# Patient Record
Sex: FEMALE | Race: BLACK OR AFRICAN AMERICAN | NOT HISPANIC OR LATINO | Employment: UNEMPLOYED | URBAN - METROPOLITAN AREA
[De-identification: names, ages, dates, MRNs, and addresses within clinical notes are randomized per-mention and may not be internally consistent; named-entity substitution may affect disease eponyms.]

---

## 2024-06-28 ENCOUNTER — HOSPITAL ENCOUNTER (EMERGENCY)
Facility: HOSPITAL | Age: 24
Discharge: HOME | End: 2024-06-28
Attending: EMERGENCY MEDICINE

## 2024-06-28 VITALS
SYSTOLIC BLOOD PRESSURE: 116 MMHG | DIASTOLIC BLOOD PRESSURE: 46 MMHG | RESPIRATION RATE: 18 BRPM | HEART RATE: 56 BPM | TEMPERATURE: 98 F | OXYGEN SATURATION: 97 %

## 2024-06-28 DIAGNOSIS — R11.2 NAUSEA AND VOMITING, UNSPECIFIED VOMITING TYPE: Primary | ICD-10-CM

## 2024-06-28 LAB
ALBUMIN SERPL-MCNC: 3.9 G/DL (ref 3.5–5)
ALBUMIN/GLOB SERPL: 1 RATIO (ref 1.1–2)
ALP SERPL-CCNC: 59 UNIT/L (ref 40–150)
ALT SERPL-CCNC: 17 UNIT/L (ref 0–55)
AMPHET UR QL SCN: NEGATIVE
ANION GAP SERPL CALC-SCNC: 12 MEQ/L
AST SERPL-CCNC: 35 UNIT/L (ref 5–34)
B-HCG UR QL: NEGATIVE
BACTERIA #/AREA URNS AUTO: ABNORMAL /HPF
BARBITURATE SCN PRESENT UR: NEGATIVE
BASOPHILS # BLD AUTO: 0.02 X10(3)/MCL
BASOPHILS NFR BLD AUTO: 0.2 %
BENZODIAZ UR QL SCN: NEGATIVE
BILIRUB SERPL-MCNC: 0.4 MG/DL
BILIRUB UR QL STRIP.AUTO: NEGATIVE
BUN SERPL-MCNC: 13.3 MG/DL (ref 7–18.7)
C TRACH DNA SPEC QL NAA+PROBE: NOT DETECTED
CALCIUM SERPL-MCNC: 9.2 MG/DL (ref 8.4–10.2)
CANNABINOIDS UR QL SCN: POSITIVE
CHLORIDE SERPL-SCNC: 108 MMOL/L (ref 98–107)
CLARITY UR: ABNORMAL
CO2 SERPL-SCNC: 23 MMOL/L (ref 22–29)
COCAINE UR QL SCN: NEGATIVE
COLOR UR AUTO: YELLOW
CREAT SERPL-MCNC: 0.89 MG/DL (ref 0.55–1.02)
CREAT/UREA NIT SERPL: 15
EOSINOPHIL # BLD AUTO: 0.02 X10(3)/MCL (ref 0–0.9)
EOSINOPHIL NFR BLD AUTO: 0.2 %
ERYTHROCYTE [DISTWIDTH] IN BLOOD BY AUTOMATED COUNT: 11.9 % (ref 11.5–17)
ETHANOL SERPL-MCNC: <10 MG/DL
FENTANYL UR QL SCN: NEGATIVE
GFR SERPLBLD CREATININE-BSD FMLA CKD-EPI: >60 ML/MIN/1.73/M2
GLOBULIN SER-MCNC: 3.8 GM/DL (ref 2.4–3.5)
GLUCOSE SERPL-MCNC: 135 MG/DL (ref 74–100)
GLUCOSE UR QL STRIP: NEGATIVE
HCT VFR BLD AUTO: 41.1 % (ref 37–47)
HGB BLD-MCNC: 13.7 G/DL (ref 12–16)
HGB UR QL STRIP: ABNORMAL
IMM GRANULOCYTES # BLD AUTO: 0.05 X10(3)/MCL (ref 0–0.04)
IMM GRANULOCYTES NFR BLD AUTO: 0.4 %
KETONES UR QL STRIP: 40
LEUKOCYTE ESTERASE UR QL STRIP: ABNORMAL
LIPASE SERPL-CCNC: 16 U/L
LYMPHOCYTES # BLD AUTO: 1.25 X10(3)/MCL (ref 0.6–4.6)
LYMPHOCYTES NFR BLD AUTO: 11 %
MCH RBC QN AUTO: 30.9 PG (ref 27–31)
MCHC RBC AUTO-ENTMCNC: 33.3 G/DL (ref 33–36)
MCV RBC AUTO: 92.6 FL (ref 80–94)
MDMA UR QL SCN: NEGATIVE
MONOCYTES # BLD AUTO: 0.22 X10(3)/MCL (ref 0.1–1.3)
MONOCYTES NFR BLD AUTO: 1.9 %
N GONORRHOEA DNA SPEC QL NAA+PROBE: NOT DETECTED
NEUTROPHILS # BLD AUTO: 9.78 X10(3)/MCL (ref 2.1–9.2)
NEUTROPHILS NFR BLD AUTO: 86.3 %
NITRITE UR QL STRIP: NEGATIVE
NRBC BLD AUTO-RTO: 0 %
OPIATES UR QL SCN: NEGATIVE
PCP UR QL: NEGATIVE
PH UR STRIP: 7 [PH]
PH UR: 7 [PH] (ref 3–11)
PLATELET # BLD AUTO: 257 X10(3)/MCL (ref 130–400)
PMV BLD AUTO: 9.4 FL (ref 7.4–10.4)
POTASSIUM SERPL-SCNC: 3.6 MMOL/L (ref 3.5–5.1)
PROT SERPL-MCNC: 7.7 GM/DL (ref 6.4–8.3)
PROT UR QL STRIP: 30
RBC # BLD AUTO: 4.44 X10(6)/MCL (ref 4.2–5.4)
RBC #/AREA URNS AUTO: ABNORMAL /HPF
SODIUM SERPL-SCNC: 143 MMOL/L (ref 136–145)
SOURCE (OHS): NORMAL
SP GR UR STRIP.AUTO: 1.02 (ref 1–1.03)
SPECIFIC GRAVITY, URINE AUTO (.000) (OHS): 1.02 (ref 1–1.03)
SQUAMOUS #/AREA URNS AUTO: ABNORMAL /HPF
UROBILINOGEN UR STRIP-ACNC: 0.2
WBC # BLD AUTO: 11.34 X10(3)/MCL (ref 4.5–11.5)
WBC #/AREA URNS AUTO: ABNORMAL /HPF

## 2024-06-28 PROCEDURE — 87591 N.GONORRHOEAE DNA AMP PROB: CPT | Performed by: EMERGENCY MEDICINE

## 2024-06-28 PROCEDURE — 82077 ASSAY SPEC XCP UR&BREATH IA: CPT | Performed by: EMERGENCY MEDICINE

## 2024-06-28 PROCEDURE — 80307 DRUG TEST PRSMV CHEM ANLYZR: CPT | Performed by: EMERGENCY MEDICINE

## 2024-06-28 PROCEDURE — 96375 TX/PRO/DX INJ NEW DRUG ADDON: CPT

## 2024-06-28 PROCEDURE — 96361 HYDRATE IV INFUSION ADD-ON: CPT

## 2024-06-28 PROCEDURE — 85025 COMPLETE CBC W/AUTO DIFF WBC: CPT | Performed by: EMERGENCY MEDICINE

## 2024-06-28 PROCEDURE — 96374 THER/PROPH/DIAG INJ IV PUSH: CPT

## 2024-06-28 PROCEDURE — 87491 CHLMYD TRACH DNA AMP PROBE: CPT | Performed by: EMERGENCY MEDICINE

## 2024-06-28 PROCEDURE — 81003 URINALYSIS AUTO W/O SCOPE: CPT | Performed by: EMERGENCY MEDICINE

## 2024-06-28 PROCEDURE — 25500020 PHARM REV CODE 255: Performed by: EMERGENCY MEDICINE

## 2024-06-28 PROCEDURE — 81001 URINALYSIS AUTO W/SCOPE: CPT | Mod: XB | Performed by: EMERGENCY MEDICINE

## 2024-06-28 PROCEDURE — 81025 URINE PREGNANCY TEST: CPT | Performed by: EMERGENCY MEDICINE

## 2024-06-28 PROCEDURE — 83690 ASSAY OF LIPASE: CPT | Performed by: EMERGENCY MEDICINE

## 2024-06-28 PROCEDURE — 99285 EMERGENCY DEPT VISIT HI MDM: CPT | Mod: 25

## 2024-06-28 PROCEDURE — 25000003 PHARM REV CODE 250: Performed by: EMERGENCY MEDICINE

## 2024-06-28 PROCEDURE — 63600175 PHARM REV CODE 636 W HCPCS: Performed by: EMERGENCY MEDICINE

## 2024-06-28 PROCEDURE — 80053 COMPREHEN METABOLIC PANEL: CPT | Performed by: EMERGENCY MEDICINE

## 2024-06-28 RX ORDER — ONDANSETRON HYDROCHLORIDE 2 MG/ML
4 INJECTION, SOLUTION INTRAVENOUS
Status: COMPLETED | OUTPATIENT
Start: 2024-06-28 | End: 2024-06-28

## 2024-06-28 RX ORDER — KETOROLAC TROMETHAMINE 30 MG/ML
30 INJECTION, SOLUTION INTRAMUSCULAR; INTRAVENOUS
Status: COMPLETED | OUTPATIENT
Start: 2024-06-28 | End: 2024-06-28

## 2024-06-28 RX ORDER — FAMOTIDINE 10 MG/ML
20 INJECTION INTRAVENOUS
Status: COMPLETED | OUTPATIENT
Start: 2024-06-28 | End: 2024-06-28

## 2024-06-28 RX ORDER — HYDROMORPHONE HYDROCHLORIDE 2 MG/ML
0.5 INJECTION, SOLUTION INTRAMUSCULAR; INTRAVENOUS; SUBCUTANEOUS
Status: COMPLETED | OUTPATIENT
Start: 2024-06-28 | End: 2024-06-28

## 2024-06-28 RX ORDER — ONDANSETRON 4 MG/1
4 TABLET, FILM COATED ORAL EVERY 6 HOURS
Qty: 12 TABLET | Refills: 0 | Status: SHIPPED | OUTPATIENT
Start: 2024-06-28

## 2024-06-28 RX ORDER — SODIUM CHLORIDE 9 MG/ML
1000 INJECTION, SOLUTION INTRAVENOUS
Status: COMPLETED | OUTPATIENT
Start: 2024-06-28 | End: 2024-06-28

## 2024-06-28 RX ORDER — DIPHENHYDRAMINE HYDROCHLORIDE 50 MG/ML
25 INJECTION INTRAMUSCULAR; INTRAVENOUS
Status: COMPLETED | OUTPATIENT
Start: 2024-06-28 | End: 2024-06-28

## 2024-06-28 RX ORDER — FAMOTIDINE 40 MG/1
40 TABLET, FILM COATED ORAL 2 TIMES DAILY PRN
Qty: 30 TABLET | Refills: 11 | Status: SHIPPED | OUTPATIENT
Start: 2024-06-28 | End: 2025-06-28

## 2024-06-28 RX ORDER — PROCHLORPERAZINE EDISYLATE 5 MG/ML
5 INJECTION INTRAMUSCULAR; INTRAVENOUS
Status: COMPLETED | OUTPATIENT
Start: 2024-06-28 | End: 2024-06-28

## 2024-06-28 RX ORDER — PROCHLORPERAZINE 25 MG/1
25 SUPPOSITORY RECTAL EVERY 12 HOURS PRN
Qty: 10 SUPPOSITORY | Refills: 0 | Status: SHIPPED | OUTPATIENT
Start: 2024-06-28

## 2024-06-28 RX ADMIN — IOHEXOL 99 ML: 350 INJECTION, SOLUTION INTRAVENOUS at 04:06

## 2024-06-28 RX ADMIN — KETOROLAC TROMETHAMINE 30 MG: 30 INJECTION, SOLUTION INTRAMUSCULAR at 05:06

## 2024-06-28 RX ADMIN — SODIUM CHLORIDE 1000 ML: 9 INJECTION, SOLUTION INTRAVENOUS at 06:06

## 2024-06-28 RX ADMIN — ONDANSETRON 4 MG: 2 INJECTION INTRAMUSCULAR; INTRAVENOUS at 05:06

## 2024-06-28 RX ADMIN — DIPHENHYDRAMINE HYDROCHLORIDE 25 MG: 50 INJECTION INTRAMUSCULAR; INTRAVENOUS at 06:06

## 2024-06-28 RX ADMIN — FAMOTIDINE 20 MG: 10 INJECTION, SOLUTION INTRAVENOUS at 02:06

## 2024-06-28 RX ADMIN — HYDROMORPHONE HYDROCHLORIDE 0.5 MG: 2 INJECTION, SOLUTION INTRAMUSCULAR; INTRAVENOUS; SUBCUTANEOUS at 06:06

## 2024-06-28 RX ADMIN — SODIUM CHLORIDE 1000 ML: 9 INJECTION, SOLUTION INTRAVENOUS at 02:06

## 2024-06-28 RX ADMIN — PROCHLORPERAZINE EDISYLATE 5 MG: 5 INJECTION INTRAMUSCULAR; INTRAVENOUS at 02:06

## 2024-06-28 NOTE — Clinical Note
"Kevan Purcell" Johan was seen and treated in our emergency department on 6/28/2024.  She may return to work on 06/29/2024.       If you have any questions or concerns, please don't hesitate to call.       RN    "

## 2024-06-28 NOTE — ED PROVIDER NOTES
"Encounter Date: 6/28/2024       History     Chief Complaint   Patient presents with    Nausea    Vomiting     23-year-old female with a negative past medical history complains of 6 hours of nausea, vomiting, lower abdominal cramping.  She admits to drinking some alcohol about 10 hours ago but nothing recent.  Last menstrual period last week and she just stopped bleeding 2 days ago.  She has no dysuria or vaginal discharge.  She has had no diarrhea.  She states she just feels very clammy and weak.  She was given Zofran 4 mg IV and about 200 mL of saline prior to arrival by [facility] in ambulance but states she has not feeling any better. She states she has no symptoms or risk of STD,  "definitely not" she says.  She is here with her mom and gives permission to share medical information with her mom.    The history is provided by the patient.     Review of patient's allergies indicates:  No Known Allergies  History reviewed. No pertinent past medical history.  History reviewed. No pertinent surgical history.  No family history on file.  Social History     Tobacco Use    Smoking status: Never    Smokeless tobacco: Never     Review of Systems   Gastrointestinal:  Positive for abdominal pain, nausea and vomiting.   All other systems reviewed and are negative.      Physical Exam     Initial Vitals [06/28/24 0254]   BP Pulse Resp Temp SpO2   (!) 181/90 68 16 97.7 °F (36.5 °C) 96 %      MAP       --         Physical Exam    Nursing note and vitals reviewed.  Constitutional: She appears well-developed and well-nourished. She is not diaphoretic. No distress.   HENT:   Head: Normocephalic and atraumatic.   Mouth/Throat: Oropharynx is clear and moist.   Eyes: Conjunctivae are normal. Pupils are equal, round, and reactive to light.   Neck: Neck supple.   Cardiovascular:  Normal rate, regular rhythm, normal heart sounds and intact distal pulses.           Pulmonary/Chest: Breath sounds normal. No respiratory distress. She has no " wheezes. She has no rhonchi. She has no rales.   Abdominal: Abdomen is soft. She exhibits no distension. There is abdominal tenderness.   Moderate lower abdominal tenderness, both left and right lower quadrant as well as suprapubic area.  No mass.  No rebound or guarding. There is no guarding.   Musculoskeletal:         General: No tenderness or edema. Normal range of motion.      Cervical back: Neck supple.     Neurological: She is alert and oriented to person, place, and time.   Skin: Skin is warm and dry. Capillary refill takes less than 2 seconds. No rash noted.   Psychiatric: She has a normal mood and affect. Thought content normal.         ED Course   Procedures  Labs Reviewed   COMPREHENSIVE METABOLIC PANEL - Abnormal; Notable for the following components:       Result Value    Chloride 108 (*)     Glucose 135 (*)     Globulin 3.8 (*)     Albumin/Globulin Ratio 1.0 (*)     AST 35 (*)     All other components within normal limits   URINALYSIS, REFLEX TO URINE CULTURE - Abnormal; Notable for the following components:    Appearance, UA SL CLOUDY (*)     Protein, UA 30 (*)     Ketones, UA 40 (*)     Blood, UA Trace (*)     Leukocyte Esterase, UA Trace (*)     All other components within normal limits   DRUG SCREEN, URINE (BEAKER) - Abnormal; Notable for the following components:    Cannabinoids, Urine Positive (*)     All other components within normal limits    Narrative:     Cut off concentrations:    Amphetamines - 1000 ng/ml  Barbiturates - 200 ng/ml  Benzodiazepine - 200 ng/ml  Cannabinoids (THC) - 50 ng/ml  Cocaine - 300 ng/ml  Fentanyl - 1.0 ng/ml  MDMA - 500 ng/ml  Opiates - 300 ng/ml   Phencyclidine (PCP) - 25 ng/ml    Specimen submitted for drug analysis and tested for pH and specific gravity in order to evaluate sample integrity. Suspect tampering if specific gravity is <1.003 and/or pH is not within the range of 4.5 - 8.0  False negatives may result form substances such as bleach added to  urine.  False positives may result for the presence of a substance with similar chemical structure to the drug or its metabolite.    This test provides only a PRELIMINARY analytical test result. A more specific alternate chemical method must be used in order to obtain a confirmed analytical result. Gas chromatography/mass spectrometry (GC/MS) is the preferred confirmatory method. Other chemical confirmation methods are available. Clinical consideration and professional judgement should be applied to any drug of abuse test result, particularly when preliminary positive results are used.    Positive results will be confirmed only at the physicians request. Unconfirmed screening results are to be used only for medical purposes (treatment).        CBC WITH DIFFERENTIAL - Abnormal; Notable for the following components:    Neut # 9.78 (*)     IG# 0.05 (*)     All other components within normal limits   URINALYSIS, MICROSCOPIC - Abnormal; Notable for the following components:    Bacteria, UA Few (*)     WBC, UA 6-10 (*)     Squamous Epithelial Cells, UA Moderate (*)     All other components within normal limits   LIPASE - Normal   ALCOHOL,MEDICAL (ETHANOL) - Normal   PREGNANCY TEST, URINE RAPID - Normal   CHLAMYDIA/GONORRHOEAE(GC), PCR   CBC W/ AUTO DIFFERENTIAL    Narrative:     The following orders were created for panel order CBC auto differential.  Procedure                               Abnormality         Status                     ---------                               -----------         ------                     CBC with Differential[[id number]]        Abnormal            Final result                 Please view results for these tests on the individual orders.          Imaging Results              US PELVIS COMPLETE NON OB WITH DOPPLER (XPD) (Final result)  Result time 06/28/24 06:32:51   Procedure changed from US Pelvis Comp with Transvag NON-OB (xpd     Final result by Kenton Parker MD (06/28/24 06:32:51)                    Impression:      No abnormality with sonography identified.      Electronically signed by: Kenton Parker  Date:    06/28/2024  Time:    06:32               Narrative:    EXAMINATION:  Ultrasound pelvis complete non OB with Doppler    CLINICAL HISTORY:  Pelvic pain    TECHNIQUE:  Multiple real-time images were performed pelvis in various planes by the sonographer.    COMPARISON:  CT abdomen pelvis same date.    FINDINGS:  Uterus measures 5.2 x 2.8 x 4.4 cm.  Uterine myometrial echotexture is unremarkable.  Endometrial stripe thickness is 2.3 mm.    Right ovary measures 4.8 x 1.2 x 2.6 cm and the left ovary measures 2.5 x 1.3 x 1.7 cm.  Bilateral ovaries appear to be unremarkable.  Bilateral unremarkable arteriovenous flow to the ovaries.  No pelvic free fluid identified.                                       CT Abdomen Pelvis With IV Contrast NO Oral Contrast (Final result)  Result time 06/28/24 05:48:45      Final result by Kenton Parker MD (06/28/24 05:48:45)                   Impression:    Impression:    No acute intraabdominal or pelvic solid organ or bowel pathology identified. Details and other findings as discussed above.    No significant discrepancy with overnight report.      Electronically signed by: Kenton Parker  Date:    06/28/2024  Time:    05:48               Narrative:      Technique: CT of the abdomen and pelvis was performed with axial images as well as sagittal and coronal reconstruction images with intravenous contrast.    Comparison: None available.    Clinical History: 23-year-old female with a negative past medical history complains of 6 hours of nausea, vomiting, lower abdominal cramping. She admits to drinking some alcohol about 10 hours ago but nothing recent. Last menstrual period last week and she just stopped bleeding 2 days ago. She has no dysuria or vaginal discharge. She has had no diarrhea. She states she just feels very clammy and weak. She was given Zofran 4 mg  IV and about 200 mL of saline prior to arrival by [facility] in ambulance but states she has not.    Dosage Information: Automated Exposure Control was utilized.      Findings:    Lines and Tubes: None.    Thorax:    Lungs: The visualized lung bases appear unremarkable. No focal infiltrate or consolidation is seen.    Pleura: No effusions or thickening.    Abdomen:    Abdominal Wall: No abdominal wall pathology is seen.    Liver: The liver appears unremarkable.    Biliary System: No intrahepatic or extrahepatic biliary duct dilatation is seen.    Gallbladder: The gallbladder appears unremarkable.    Pancreas: The pancreas appears unremarkable.    Spleen: The spleen appears unremarkable.    Adrenals: The adrenal glands appear unremarkable.    Kidneys: The kidneys appear unremarkable with no stones cysts masses or hydronephrosis.    Aorta: The abdominal aorta appears unremarkable.    Bowel:    Esophagus: The visualized esophagus appears unremarkable.    Stomach: The stomach appears unremarkable.    Duodenum: Unremarkable appearing duodenum.    Small Bowel: The small bowel appears unremarkable.    Colon: Nondistended.    Appendix: The appendix appears unremarkable and is seen on Image 49, Series 3 through Image 51, Series 3.    Peritoneum: No intraperitoneal free air or ascites is seen.    Pelvis:    Bladder: The bladder appears unremarkable.    Female:    Uterus: The uterus appears unremarkable.    Ovaries: The ovaries appear unremarkable with probable bilateral physiologic cysts.    Bony structures:    Dorsal Spine: The visualized dorsal spine appears unremarkable.    Bony Pelvis: The visualized bony structures of the pelvis appear unremarkable.                        Preliminary result by Oskar Mays Jr., MD (06/28/24 05:00:49)                   Impression:    1. No acute intraabdominal or pelvic solid organ or bowel pathology identified. Details and other findings as discussed above.                Narrative:    START OF REPORT:  Technique: CT of the abdomen and pelvis was performed with axial images as well as sagittal and coronal reconstruction images with intravenous contrast.    Comparison: None available.    Clinical History: 23-year-old female with a negative past medical history complains of 6 hours of nausea, vomiting, lower abdominal cramping. She admits to drinking some alcohol about 10 hours ago but nothing recent. Last menstrual period last week and she just stopped bleeding 2 days ago. She has no dysuria or vaginal discharge. She has had no diarrhea. She states she just feels very clammy and weak. She was given Zofran 4 mg IV and about 200 mL of saline prior to arrival by [facility] in ambulance but states she has not.    Dosage Information: Automated Exposure Control was utilized.    Findings:  Lines and Tubes: None.  Thorax:  Lungs: The visualized lung bases appear unremarkable. No focal infiltrate or consolidation is seen.  Pleura: No effusions or thickening.  Heart: The heart size is within normal limits.  Abdomen:  Abdominal Wall: No abdominal wall pathology is seen.  Liver: The liver appears unremarkable.  Biliary System: No intrahepatic or extrahepatic biliary duct dilatation is seen.  Gallbladder: The gallbladder appears unremarkable.  Pancreas: The pancreas appears unremarkable.  Spleen: The spleen appears unremarkable.  Adrenals: The adrenal glands appear unremarkable.  Kidneys: The kidneys appear unremarkable with no stones cysts masses or hydronephrosis.  Aorta: The abdominal aorta appears unremarkable.  IVC: Unremarkable.  Bowel:  Esophagus: The visualized esophagus appears unremarkable.  Stomach: The stomach appears unremarkable.  Duodenum: Unremarkable appearing duodenum.  Small Bowel: The small bowel appears unremarkable.  Colon: Nondistended.  Appendix: The appendix appears unremarkable and is seen on Image 49, Series 3 through Image 51, Series 3.  Peritoneum: No intraperitoneal free  air or ascites is seen.    Pelvis:  Bladder: The bladder appears unremarkable.  Female:  Uterus: The uterus appears unremarkable.  Ovaries: The ovaries appear unremarkable with probable bilateral physiologic cysts.    Bony structures:  Dorsal Spine: The visualized dorsal spine appears unremarkable.  Bony Pelvis: The visualized bony structures of the pelvis appear unremarkable.                                         Medications   0.9%  NaCl infusion (0 mLs Intravenous Stopped 6/28/24 0357)   prochlorperazine injection Soln 5 mg (5 mg Intravenous Given 6/28/24 0255)   famotidine (PF) injection 20 mg (20 mg Intravenous Given 6/28/24 0256)   ondansetron injection 4 mg (4 mg Intravenous Given 6/28/24 0512)   iohexoL (OMNIPAQUE 350) injection 99 mL (99 mLs Intravenous Given 6/28/24 0443)   ketorolac injection 30 mg (30 mg Intravenous Given 6/28/24 0512)   diphenhydrAMINE injection 25 mg (25 mg Intravenous Given 6/28/24 0633)   HYDROmorphone (PF) injection 0.5 mg (0.5 mg Intravenous Given 6/28/24 0633)   0.9%  NaCl infusion (1,000 mLs Intravenous New Bag 6/28/24 0634)     Medical Decision Making  Amount and/or Complexity of Data Reviewed  Labs: ordered. Decision-making details documented in ED Course.  Radiology: ordered. Decision-making details documented in ED Course.    Risk  Prescription drug management.               ED Course as of 06/28/24 0933   Fri Jun 28, 2024   0338 Patient states she is too nauseated and having lower abdominal cramping.  Discussed doing a CT scan with her for symptoms do not improve.  Awaiting a UPT and chemistry result [SH]   0426 Cannabinoids, Urine(!): Positive [SH]   0426 Sodium: 143 [SH]   0426 Potassium: 3.6 [SH]   0426 Chloride(!): 108 [SH]   0426 CO2: 23 [SH]   0426 Glucose(!): 135 [SH]   0426 BUN: 13.3 [SH]   0426 Creatinine: 0.89 [SH]   0440 CT scan was done and is pending but patient vomited again so will give her a dose of Zofran. [SH]   0504 CT Abdomen Pelvis With IV Contrast NO  Oral Contrast  Preliminary reading of the CT scan is negative. [SH]   0504 Patient was still complaining of severe lower abdominal pain.  Her mom has stepped out of the room so I asked her in private whether or not she had any concern of STD and she says definitely not.  No discharge, no recent sexual encounters.  I will get a pelvic ultrasound to rule out ovarian torsion and try dose of Toradol for pain. [SH]   0617 Preliminary report of the ultrasound is negative for torsion or ovarian cyst.  She still complains of lower abdominal pain and nausea and states she just feels miserable.  She has not anything, up but occasionally dry heaving in the emesis bag.  I will give her a small dose of Dilaudid, 0.5 mg for pain, and Benadryl and observe for a while longer. [SH]   0705 Patient states that she is feeling better but is very sleepy from the medication.  We will need to observe for a little while.  Transition of care at 7 AM. Dr. Jean Baptiste will assume care and determine appropriate treatment, care of this patient as well as disposition.    [SH]   0929 Patient is much more alert.  Still feeling well.  Pass p.o. challenge. []      ED Course User Index  [] Elisa Junior MD  [] Jesse Jean Baptiste MD                           Clinical Impression:  Final diagnoses:  [R11.2] Nausea and vomiting, unspecified vomiting type (Primary)          ED Disposition Condition    Discharge Stable          ED Prescriptions       Medication Sig Dispense Start Date End Date Auth. Provider    famotidine (PEPCID) 40 MG tablet Take 1 tablet (40 mg total) by mouth 2 (two) times daily as needed for Heartburn. 30 tablet 6/28/2024 6/28/2025 Jesse Jean Baptiste MD    ondansetron (ZOFRAN) 4 MG tablet Take 1 tablet (4 mg total) by mouth every 6 (six) hours. 12 tablet 6/28/2024 -- Jesse Jean Baptiste MD    prochlorperazine (COMPAZINE) 25 MG suppository Place 1 suppository (25 mg total) rectally every 12 (twelve) hours as needed for  Nausea. 10 suppository 6/28/2024 -- Jesse Jean Baptiste MD          Follow-up Information    None          Jesse Jean Baptiste MD  06/28/24 0958

## 2024-11-21 ENCOUNTER — HOSPITAL ENCOUNTER (EMERGENCY)
Facility: HOSPITAL | Age: 24
Discharge: HOME | End: 2024-11-21
Attending: EMERGENCY MEDICINE
Payer: COMMERCIAL

## 2024-11-21 VITALS
BODY MASS INDEX: 31.2 KG/M2 | OXYGEN SATURATION: 100 % | RESPIRATION RATE: 16 BRPM | HEART RATE: 75 BPM | DIASTOLIC BLOOD PRESSURE: 72 MMHG | SYSTOLIC BLOOD PRESSURE: 120 MMHG | TEMPERATURE: 98 F | HEIGHT: 59 IN | WEIGHT: 154.75 LBS
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
## 2024-11-21 DIAGNOSIS — R51.9 NONINTRACTABLE HEADACHE, UNSPECIFIED CHRONICITY PATTERN, UNSPECIFIED HEADACHE TYPE: ICD-10-CM

## 2024-11-21 LAB
B-HCG UR QL: NEGATIVE
CTP QC/QA: YES

## 2024-11-21 PROCEDURE — 25000003 PHARM REV CODE 250: Performed by: PHYSICIAN ASSISTANT

## 2024-11-21 PROCEDURE — 99283 EMERGENCY DEPT VISIT LOW MDM: CPT | Mod: 25

## 2024-11-21 PROCEDURE — 81025 URINE PREGNANCY TEST: CPT | Performed by: PHYSICIAN ASSISTANT

## 2024-11-21 RX ORDER — DULOXETIN HYDROCHLORIDE 30 MG/1
30 CAPSULE, DELAYED RELEASE ORAL DAILY
COMMUNITY
Start: 2024-02-22

## 2024-11-21 RX ORDER — FLUCONAZOLE 150 MG/1
150 TABLET ORAL DAILY
COMMUNITY
Start: 2024-03-08 | End: 2025-03-08

## 2024-11-21 RX ORDER — HYDROCODONE BITARTRATE AND ACETAMINOPHEN 7.5; 325 MG/1; MG/1
1 TABLET ORAL
Status: COMPLETED | OUTPATIENT
Start: 2024-11-21 | End: 2024-11-21

## 2024-11-21 RX ORDER — ERGOCALCIFEROL 1.25 MG/1
1 CAPSULE ORAL
COMMUNITY
Start: 2024-11-11 | End: 2025-05-10

## 2024-11-21 RX ORDER — DULOXETIN HYDROCHLORIDE 60 MG/1
60 CAPSULE, DELAYED RELEASE ORAL DAILY
COMMUNITY
Start: 2024-02-06

## 2024-11-21 RX ORDER — HYDROXYZINE HYDROCHLORIDE 10 MG/1
10 TABLET, FILM COATED ORAL 3 TIMES DAILY PRN
COMMUNITY
Start: 2024-02-06

## 2024-11-21 RX ORDER — IBUPROFEN 800 MG/1
800 TABLET ORAL EVERY 8 HOURS PRN
Qty: 15 TABLET | Refills: 0 | Status: SHIPPED | OUTPATIENT
Start: 2024-11-21

## 2024-11-21 RX ORDER — METRONIDAZOLE 500 MG/1
500 TABLET ORAL EVERY 12 HOURS
COMMUNITY
Start: 2024-11-11 | End: 2024-11-21

## 2024-11-21 RX ORDER — TRAZODONE HYDROCHLORIDE 50 MG/1
50 TABLET ORAL NIGHTLY
COMMUNITY
Start: 2024-01-16

## 2024-11-21 RX ORDER — SPIRONOLACTONE 50 MG/1
50 TABLET, FILM COATED ORAL DAILY
COMMUNITY
Start: 2024-10-24

## 2024-11-21 RX ORDER — TIZANIDINE 4 MG/1
4 TABLET ORAL EVERY 8 HOURS PRN
Qty: 15 TABLET | Refills: 0 | Status: SHIPPED | OUTPATIENT
Start: 2024-11-21

## 2024-11-21 RX ADMIN — HYDROCODONE BITARTRATE AND ACETAMINOPHEN 1 TABLET: 7.5; 325 TABLET ORAL at 12:11
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
## 2025-01-23 ENCOUNTER — HOSPITAL ENCOUNTER (EMERGENCY)
Facility: HOSPITAL | Age: 25
Discharge: HOME | End: 2025-01-23
Attending: EMERGENCY MEDICINE
Payer: MEDICAID

## 2025-01-23 VITALS
SYSTOLIC BLOOD PRESSURE: 130 MMHG | OXYGEN SATURATION: 99 % | TEMPERATURE: 100 F | DIASTOLIC BLOOD PRESSURE: 77 MMHG | RESPIRATION RATE: 18 BRPM | BODY MASS INDEX: 31.04 KG/M2 | HEIGHT: 59 IN | WEIGHT: 154 LBS | HEART RATE: 66 BPM

## 2025-01-23 DIAGNOSIS — J10.1 INFLUENZA A: Primary | ICD-10-CM

## 2025-01-23 LAB
FLUAV AG UPPER RESP QL IA.RAPID: DETECTED
FLUBV AG UPPER RESP QL IA.RAPID: NOT DETECTED
SARS-COV-2 RNA RESP QL NAA+PROBE: NOT DETECTED

## 2025-01-23 PROCEDURE — 0240U COVID/FLU A&B PCR: CPT | Performed by: PHYSICIAN ASSISTANT

## 2025-01-23 PROCEDURE — 99284 EMERGENCY DEPT VISIT MOD MDM: CPT

## 2025-01-23 RX ORDER — OSELTAMIVIR PHOSPHATE 75 MG/1
75 CAPSULE ORAL 2 TIMES DAILY
Qty: 10 CAPSULE | Refills: 0 | Status: SHIPPED | OUTPATIENT
Start: 2025-01-23 | End: 2025-01-28

## 2025-01-23 RX ORDER — DICLOFENAC SODIUM 50 MG/1
50 TABLET, DELAYED RELEASE ORAL 3 TIMES DAILY PRN
Qty: 12 TABLET | Refills: 0 | Status: SHIPPED | OUTPATIENT
Start: 2025-01-23

## 2025-01-23 RX ORDER — BENZONATATE 200 MG/1
200 CAPSULE ORAL 3 TIMES DAILY PRN
Qty: 15 CAPSULE | Refills: 0 | Status: SHIPPED | OUTPATIENT
Start: 2025-01-23 | End: 2025-01-28

## 2025-01-23 NOTE — Clinical Note
"Garymarco"Mabel Prado was seen and treated in our emergency department on 1/23/2025.  She may return to work on 01/28/2025.       If you have any questions or concerns, please don't hesitate to call.      Patricia Pierce PA-C"

## 2025-01-23 NOTE — ED PROVIDER NOTES
Encounter Date: 1/23/2025       History     Chief Complaint   Patient presents with    Generalized Body Aches     Body aches, cough fever & headache. Last ibuprofen at 1200. Denies sore throat.      24 year old female with no past medical history presents with body aches and fatigue for two days. Patient reports body aches intermittently. She reports she took 800 mg ibuprofen approximately 2-3 hours ago. She denies any abdominal pain, nausea, vomiting, diarrhea. She reports nasal congestion, cough. No chest pain, shortness of breath. Lmp was 1/9/25.     The history is provided by the patient. No  was used.   Cough  This is a new problem. The current episode started two days ago. The problem occurs hourly. The problem has been waxing and waning. The cough is Non-productive. Associated symptoms include rhinorrhea. Pertinent negatives include no chest pain, no sore throat and no shortness of breath. She has tried nothing for the symptoms. She is not a smoker. Her past medical history does not include bronchitis or pneumonia.     Review of patient's allergies indicates:  No Known Allergies  No past medical history on file.  No past surgical history on file.  No family history on file.  Social History     Tobacco Use    Smoking status: Never    Smokeless tobacco: Never     Review of Systems   Constitutional:  Negative for fever.   HENT:  Positive for rhinorrhea. Negative for sore throat.    Respiratory:  Positive for cough. Negative for shortness of breath.    Cardiovascular:  Negative for chest pain.   Gastrointestinal:  Negative for constipation, diarrhea and nausea.   Genitourinary:  Negative for dysuria.   Musculoskeletal:  Negative for back pain.   Skin:  Negative for rash.   Neurological:  Negative for weakness.   Hematological:  Does not bruise/bleed easily.       Physical Exam     Initial Vitals [01/23/25 1309]   BP Pulse Resp Temp SpO2   130/77 66 18 99.7 °F (37.6 °C) 99 %      MAP       --          Physical Exam    Nursing note and vitals reviewed.  Constitutional: She appears well-developed and well-nourished.   HENT:   Head: Normocephalic and atraumatic. Mouth/Throat: Oropharynx is clear and moist.   Eyes: Pupils are equal, round, and reactive to light.   Cardiovascular:  Normal rate, regular rhythm and normal heart sounds.           Pulmonary/Chest: Breath sounds normal.   Abdominal: Abdomen is soft. Bowel sounds are normal.   Musculoskeletal:         General: Normal range of motion.     Neurological: She is alert and oriented to person, place, and time.   Skin: Skin is warm.         ED Course   Procedures  Labs Reviewed   COVID/FLU A&B PCR - Abnormal       Result Value    Influenza A PCR Detected (*)     Influenza B PCR Not Detected      SARS-CoV-2 PCR Not Detected      Narrative:     The Xpert Xpress SARS-CoV-2/FLU/RSV plus is a rapid, multiplexed real-time PCR test intended for the simultaneous qualitative detection and differentiation of SARS-CoV-2, Influenza A, Influenza B, and respiratory syncytial virus (RSV) viral RNA in either nasopharyngeal swab or nasal swab specimens.                Imaging Results    None          Medications - No data to display  Medical Decision Making  24 year old female with no past medical history presents with body aches and fatigue for two days. Patient reports body aches intermittently. She reports she took 800 mg ibuprofen approximately 2-3 hours ago. She denies any abdominal pain, nausea, vomiting, diarrhea. She reports nasal congestion, cough. No chest pain, shortness of breath. Lmp was 1/9/25.  Testing returned positive for influenza A.  Will place the patient on Tamiflu, anti-inflammatories and cough suppressant.  We will give her a work note to be out until Tuesday, 01/28/2025.  Discussed alternating Tylenol and Motrin every 4 hours for fever control.                                      Clinical Impression:  Final diagnoses:  [J10.1] Influenza A (Primary)           ED Disposition Condition    Discharge Stable          ED Prescriptions       Medication Sig Dispense Start Date End Date Auth. Provider    oseltamivir (TAMIFLU) 75 MG capsule Take 1 capsule (75 mg total) by mouth 2 (two) times daily. for 5 days 10 capsule 1/23/2025 1/28/2025 Patricia Pierce PA-C    benzonatate (TESSALON) 200 MG capsule Take 1 capsule (200 mg total) by mouth 3 (three) times daily as needed for Cough. 15 capsule 1/23/2025 1/28/2025 Patricia Pierce PA-C    diclofenac (VOLTAREN) 50 MG EC tablet Take 1 tablet (50 mg total) by mouth 3 (three) times daily as needed (pain). 12 tablet 1/23/2025 -- Patricia Pierce PA-C          Follow-up Information       Follow up With Specialties Details Why Contact Info    [facility]    [address] [id number] [phone]               Patricia Pierce PA-C  01/23/25 4503

## 2025-01-23 NOTE — DISCHARGE INSTRUCTIONS
Alternate Tylenol and Motrin or voltaren every 4 hours as needed for fever control.  Take the Tamiflu with food.  Take the cough medication as needed.  Do not take the Voltaren and ibuprofen.  The Voltaren will help with her body aches however act similarly to ibuprofen.

## 2025-04-04 DIAGNOSIS — H61.23 IMPACTED CERUMEN OF BOTH EARS: Primary | ICD-10-CM

## 2025-06-05 DIAGNOSIS — H33.302 LEFT RETINAL DEFECT: Primary | ICD-10-CM
